# Patient Record
Sex: MALE | Race: BLACK OR AFRICAN AMERICAN | NOT HISPANIC OR LATINO | Employment: FULL TIME | URBAN - METROPOLITAN AREA
[De-identification: names, ages, dates, MRNs, and addresses within clinical notes are randomized per-mention and may not be internally consistent; named-entity substitution may affect disease eponyms.]

---

## 2024-05-02 ENCOUNTER — APPOINTMENT (OUTPATIENT)
Dept: CARDIOLOGY | Facility: HOSPITAL | Age: 59
End: 2024-05-02

## 2024-05-02 ENCOUNTER — HOSPITAL ENCOUNTER (EMERGENCY)
Facility: HOSPITAL | Age: 59
Discharge: HOME | End: 2024-05-03
Attending: EMERGENCY MEDICINE

## 2024-05-02 ENCOUNTER — APPOINTMENT (OUTPATIENT)
Dept: RADIOLOGY | Facility: HOSPITAL | Age: 59
End: 2024-05-02

## 2024-05-02 ENCOUNTER — HOSPITAL ENCOUNTER (OUTPATIENT)
Facility: HOSPITAL | Age: 59
Setting detail: OBSERVATION
Discharge: OTHER INSTITUTION | End: 2024-05-02
Attending: STUDENT IN AN ORGANIZED HEALTH CARE EDUCATION/TRAINING PROGRAM | Admitting: PSYCHIATRY & NEUROLOGY

## 2024-05-02 VITALS
SYSTOLIC BLOOD PRESSURE: 193 MMHG | DIASTOLIC BLOOD PRESSURE: 110 MMHG | OXYGEN SATURATION: 97 % | HEART RATE: 72 BPM | BODY MASS INDEX: 30.06 KG/M2 | RESPIRATION RATE: 15 BRPM | HEIGHT: 70 IN | TEMPERATURE: 98.4 F | WEIGHT: 210 LBS

## 2024-05-02 DIAGNOSIS — H47.011 NON-ARTERITIC ANTERIOR ISCHEMIC OPTIC NEUROPATHY OF RIGHT EYE: Primary | ICD-10-CM

## 2024-05-02 DIAGNOSIS — I10 ESSENTIAL HYPERTENSION: ICD-10-CM

## 2024-05-02 DIAGNOSIS — H53.9 VISION DISTURBANCE: Primary | ICD-10-CM

## 2024-05-02 LAB
ALBUMIN SERPL-MCNC: 4.2 G/DL (ref 3.5–5)
ALP BLD-CCNC: 64 U/L (ref 35–125)
ALT SERPL-CCNC: 17 U/L (ref 5–40)
ANION GAP SERPL CALC-SCNC: 11 MMOL/L
AST SERPL-CCNC: 20 U/L (ref 5–40)
BASOPHILS # BLD AUTO: 0.04 X10*3/UL (ref 0–0.1)
BASOPHILS NFR BLD AUTO: 0.7 %
BILIRUB SERPL-MCNC: 0.3 MG/DL (ref 0.1–1.2)
BUN SERPL-MCNC: 11 MG/DL (ref 8–25)
CALCIUM SERPL-MCNC: 9.2 MG/DL (ref 8.5–10.4)
CHLORIDE SERPL-SCNC: 105 MMOL/L (ref 97–107)
CO2 SERPL-SCNC: 28 MMOL/L (ref 24–31)
CREAT SERPL-MCNC: 1.3 MG/DL (ref 0.4–1.6)
EGFRCR SERPLBLD CKD-EPI 2021: 64 ML/MIN/1.73M*2
EOSINOPHIL # BLD AUTO: 0.12 X10*3/UL (ref 0–0.7)
EOSINOPHIL NFR BLD AUTO: 2 %
ERYTHROCYTE [DISTWIDTH] IN BLOOD BY AUTOMATED COUNT: 14.3 % (ref 11.5–14.5)
GLUCOSE SERPL-MCNC: 139 MG/DL (ref 65–99)
HCT VFR BLD AUTO: 40.5 % (ref 41–52)
HGB BLD-MCNC: 13 G/DL (ref 13.5–17.5)
IMM GRANULOCYTES # BLD AUTO: 0.02 X10*3/UL (ref 0–0.7)
IMM GRANULOCYTES NFR BLD AUTO: 0.3 % (ref 0–0.9)
INR PPP: 1 (ref 0.9–1.2)
LYMPHOCYTES # BLD AUTO: 2.47 X10*3/UL (ref 1.2–4.8)
LYMPHOCYTES NFR BLD AUTO: 41.7 %
MCH RBC QN AUTO: 29.6 PG (ref 26–34)
MCHC RBC AUTO-ENTMCNC: 32.1 G/DL (ref 32–36)
MCV RBC AUTO: 92 FL (ref 80–100)
MONOCYTES # BLD AUTO: 0.37 X10*3/UL (ref 0.1–1)
MONOCYTES NFR BLD AUTO: 6.2 %
NEUTROPHILS # BLD AUTO: 2.91 X10*3/UL (ref 1.2–7.7)
NEUTROPHILS NFR BLD AUTO: 49.1 %
NRBC BLD-RTO: 0 /100 WBCS (ref 0–0)
PLATELET # BLD AUTO: 262 X10*3/UL (ref 150–450)
POTASSIUM SERPL-SCNC: 4 MMOL/L (ref 3.4–5.1)
PROT SERPL-MCNC: 8 G/DL (ref 5.9–7.9)
PROTHROMBIN TIME: 10.5 SECONDS (ref 9.3–12.7)
RBC # BLD AUTO: 4.39 X10*6/UL (ref 4.5–5.9)
SODIUM SERPL-SCNC: 144 MMOL/L (ref 133–145)
WBC # BLD AUTO: 5.9 X10*3/UL (ref 4.4–11.3)

## 2024-05-02 PROCEDURE — 85025 COMPLETE CBC W/AUTO DIFF WBC: CPT | Performed by: PHYSICIAN ASSISTANT

## 2024-05-02 PROCEDURE — 99285 EMERGENCY DEPT VISIT HI MDM: CPT | Mod: 25

## 2024-05-02 PROCEDURE — 85610 PROTHROMBIN TIME: CPT | Performed by: PHYSICIAN ASSISTANT

## 2024-05-02 PROCEDURE — 70450 CT HEAD/BRAIN W/O DYE: CPT

## 2024-05-02 PROCEDURE — 86256 FLUORESCENT ANTIBODY TITER: CPT | Performed by: STUDENT IN AN ORGANIZED HEALTH CARE EDUCATION/TRAINING PROGRAM

## 2024-05-02 PROCEDURE — 80053 COMPREHEN METABOLIC PANEL: CPT | Performed by: PHYSICIAN ASSISTANT

## 2024-05-02 PROCEDURE — 2500000001 HC RX 250 WO HCPCS SELF ADMINISTERED DRUGS (ALT 637 FOR MEDICARE OP): Performed by: PHYSICIAN ASSISTANT

## 2024-05-02 PROCEDURE — 99285 EMERGENCY DEPT VISIT HI MDM: CPT

## 2024-05-02 PROCEDURE — G0378 HOSPITAL OBSERVATION PER HR: HCPCS

## 2024-05-02 PROCEDURE — 36415 COLL VENOUS BLD VENIPUNCTURE: CPT | Performed by: PHYSICIAN ASSISTANT

## 2024-05-02 PROCEDURE — 85652 RBC SED RATE AUTOMATED: CPT | Performed by: STUDENT IN AN ORGANIZED HEALTH CARE EDUCATION/TRAINING PROGRAM

## 2024-05-02 PROCEDURE — 93005 ELECTROCARDIOGRAM TRACING: CPT

## 2024-05-02 PROCEDURE — 70450 CT HEAD/BRAIN W/O DYE: CPT | Performed by: STUDENT IN AN ORGANIZED HEALTH CARE EDUCATION/TRAINING PROGRAM

## 2024-05-02 PROCEDURE — 36415 COLL VENOUS BLD VENIPUNCTURE: CPT | Performed by: STUDENT IN AN ORGANIZED HEALTH CARE EDUCATION/TRAINING PROGRAM

## 2024-05-02 PROCEDURE — 82565 ASSAY OF CREATININE: CPT | Performed by: STUDENT IN AN ORGANIZED HEALTH CARE EDUCATION/TRAINING PROGRAM

## 2024-05-02 PROCEDURE — 99285 EMERGENCY DEPT VISIT HI MDM: CPT | Performed by: EMERGENCY MEDICINE

## 2024-05-02 PROCEDURE — 86140 C-REACTIVE PROTEIN: CPT | Performed by: STUDENT IN AN ORGANIZED HEALTH CARE EDUCATION/TRAINING PROGRAM

## 2024-05-02 RX ORDER — TETRACAINE HYDROCHLORIDE 5 MG/ML
1 SOLUTION OPHTHALMIC ONCE
Status: COMPLETED | OUTPATIENT
Start: 2024-05-02 | End: 2024-05-02

## 2024-05-02 RX ORDER — ASPIRIN 325 MG
325 TABLET ORAL ONCE
Status: COMPLETED | OUTPATIENT
Start: 2024-05-02 | End: 2024-05-02

## 2024-05-02 RX ADMIN — ASPIRIN 325 MG: 325 TABLET ORAL at 16:12

## 2024-05-02 RX ADMIN — FLUORESCEIN SODIUM 1 STRIP: 1 STRIP OPHTHALMIC at 15:17

## 2024-05-02 RX ADMIN — TETRACAINE HYDROCHLORIDE 1 DROP: 5 SOLUTION OPHTHALMIC at 15:17

## 2024-05-02 ASSESSMENT — LIFESTYLE VARIABLES
HAVE YOU EVER FELT YOU SHOULD CUT DOWN ON YOUR DRINKING: NO
TOTAL SCORE: 0
EVER HAD A DRINK FIRST THING IN THE MORNING TO STEADY YOUR NERVES TO GET RID OF A HANGOVER: NO
EVER HAD A DRINK FIRST THING IN THE MORNING TO STEADY YOUR NERVES TO GET RID OF A HANGOVER: NO
EVER FELT BAD OR GUILTY ABOUT YOUR DRINKING: NO
HAVE PEOPLE ANNOYED YOU BY CRITICIZING YOUR DRINKING: NO
TOTAL SCORE: 0
HAVE PEOPLE ANNOYED YOU BY CRITICIZING YOUR DRINKING: NO
EVER FELT BAD OR GUILTY ABOUT YOUR DRINKING: NO
HAVE YOU EVER FELT YOU SHOULD CUT DOWN ON YOUR DRINKING: NO

## 2024-05-02 ASSESSMENT — COLUMBIA-SUICIDE SEVERITY RATING SCALE - C-SSRS
2. HAVE YOU ACTUALLY HAD ANY THOUGHTS OF KILLING YOURSELF?: NO
2. HAVE YOU ACTUALLY HAD ANY THOUGHTS OF KILLING YOURSELF?: NO
6. HAVE YOU EVER DONE ANYTHING, STARTED TO DO ANYTHING, OR PREPARED TO DO ANYTHING TO END YOUR LIFE?: NO
1. IN THE PAST MONTH, HAVE YOU WISHED YOU WERE DEAD OR WISHED YOU COULD GO TO SLEEP AND NOT WAKE UP?: NO
6. HAVE YOU EVER DONE ANYTHING, STARTED TO DO ANYTHING, OR PREPARED TO DO ANYTHING TO END YOUR LIFE?: NO
1. IN THE PAST MONTH, HAVE YOU WISHED YOU WERE DEAD OR WISHED YOU COULD GO TO SLEEP AND NOT WAKE UP?: NO

## 2024-05-02 ASSESSMENT — VISUAL ACUITY
OU: 20/40
OU: 20/40
OD: 20/200
OS: 20/40
OS: 20/40
OD: 20/200

## 2024-05-02 ASSESSMENT — PAIN SCALES - GENERAL
PAINLEVEL_OUTOF10: 0 - NO PAIN

## 2024-05-02 ASSESSMENT — PAIN - FUNCTIONAL ASSESSMENT
PAIN_FUNCTIONAL_ASSESSMENT: 0-10

## 2024-05-02 NOTE — ED PROVIDER NOTES
HPI   Chief Complaint   Patient presents with    Eye Problem     4/8 I started having cloudy vision no pain       58-year-old male presented emergency department the chief complaint evaluated and has been for about the last 3 and half weeks of his right eye.  It is not painful.  He denies any visual field defect.  States that his whole visual field is affected to the right eye.  There is no pain with extraocular movement.  He does not wear corrective lenses.  [place] and sent to the emergency department for further workup with concern for central retinal artery occlusion on the paperwork.  No other complaint.                          [place] Coma Scale Score: 15         NIH Stroke Scale: 0             Patient History   No past medical history on file.  No past surgical history on file.  No family history on file.  Social History     Tobacco Use    Smoking status: Not on file    Smokeless tobacco: Not on file   Substance Use Topics    Alcohol use: Not on file    Drug use: Not on file       Physical Exam   ED Triage Vitals [05/02/24 1256]   Temperature Heart Rate Respirations BP   36.9 °C (98.4 °F) 65 18 (!) 207/107      Pulse Ox Temp Source Heart Rate Source Patient Position   100 % Temporal Monitor Sitting      BP Location FiO2 (%)     Left arm --       Physical Exam  Vitals and nursing note reviewed.   Constitutional:       Appearance: Normal appearance.   HENT:      Head: Normocephalic.      Mouth/Throat:      Mouth: Mucous membranes are dry.   Eyes:      Extraocular Movements: Extraocular movements intact.      Pupils: Pupils are equal, round, and reactive to light.      Comments: Patient with intact visual fields and intact extraocular movements   Cardiovascular:      Rate and Rhythm: Normal rate and regular rhythm.   Pulmonary:      Effort: Pulmonary effort is normal.   Abdominal:      General: Abdomen is flat.   Musculoskeletal:         General: Normal range of motion.       Cervical back: Normal range of motion.   Skin:     General: Skin is warm.   Neurological:      Mental Status: He is alert and oriented to person, place, and time.         ED Course & MDM   ED Course as of 05/02/24 1548   u May 02, 2024   1402 EKG presented to me at 2:02 PM     EKG as interpreted by me shows sinus bradycardia at a ventricular rate of 58 bpm, normal axis, normal intervals, no STEMI.   [DH]      ED Course User Index  [DH] Tushar Melo MD         Diagnoses as of 05/02/24 1548   Vision disturbance       Medical Decision Making  I have seen and evaluated this patient.  The attending physician has also seen and evaluated this patient.  Vital signs, laboratory testing and diagnostic images if applicable have been reviewed.  All laboratory and imaging is interpreted by myself unless otherwise stated.  Radiology studies are also formally interpreted by radiologist.    CBC without significant leukocytosis or anemia, metabolic panel without significant renal impairment or electrolyte abnormality.  EKG acutely nonischemic, CT brain negative.  Patient with whole visual field right eye affected.  There is no focal visual field deficits or eye palsy on examination, NIH of 0.  Patient is admitted to internal medicine service, given aspirin, neurology consultation.  Admitted for further treatment and management.    Labs Reviewed  CBC WITH AUTO DIFFERENTIAL - Abnormal     WBC                           5.9                    nRBC                          0.0                    RBC                           4.39 (*)               Hemoglobin                    13.0 (*)               Hematocrit                    40.5 (*)               MCV                           92                     MCH                           29.6                   MCHC                          32.1                   RDW                           14.3                   Platelets                     262                    Neutrophils %                  49.1                   Immature Granulocytes %, Automated   0.3                    Lymphocytes %                 41.7                   Monocytes %                   6.2                    Eosinophils %                 2.0                    Basophils %                   0.7                    Neutrophils Absolute          2.91                   Immature Granulocytes Absolute, Au*   0.02                   Lymphocytes Absolute          2.47                   Monocytes Absolute            0.37                   Eosinophils Absolute          0.12                   Basophils Absolute            0.04                COMPREHENSIVE METABOLIC PANEL - Abnormal     Glucose                       139 (*)                Sodium                        144                    Potassium                     4.0                    Chloride                      105                    Bicarbonate                   28                     Urea Nitrogen                 11                     Creatinine                    1.30                   eGFR                          64                     Calcium                       9.2                    Albumin                       4.2                    Alkaline Phosphatase          64                     Total Protein                 8.0 (*)                AST                           20                     Bilirubin, Total              0.3                    ALT                           17                     Anion Gap                     11                  PROTIME-INR - Normal  CT head wo IV contrast   Final Result    No acute intracranial hemorrhage, mass effect, or CT apparent acute    infarct.          MACRO    None          Signed by: Sadiq Hernandez 5/2/2024 2:38 PM    Dictation workstation:   [id number]     Medications  aspirin tablet 325 mg (has no administration in time range)  fluorescein 1 mg ophthalmic strip 1 strip (1 strip Both Eyes Given 5/2/24 0867)  tetracaine (PF)  0.5 % ophthalmic solution 1 drop (1 drop Both Eyes Given 5/2/24 0127)  New Prescriptions  No medications on file            Procedure  Procedures     Neil Griffin PA-C  05/02/24 1309

## 2024-05-02 NOTE — PROGRESS NOTES
Pharmacy Medication History Review    Todd Hillman is a 58 y.o. male admitted for cloudy vision. Pharmacy reviewed the patient's [id number] medications and allergies for accuracy.    Medications ADDED:  none  Medications CHANGED:  none  Medications REMOVED:   none     The list below reflects the updated PTA list. Comments regarding how patient may be taking medications differently can be found in the Admit Orders Activity  None        The list below reflects the updated allergy list. Please review each documented allergy for additional clarification and justification.  Allergies  Reviewed by Neil Griffin PA-C on 5/2/2024   No Known Allergies         Pharmacy has been updated to  [facility].    Sources used to complete the med history include patient interview    Below are additional concerns with the patient's PTA list.  none    Meg Brice, PharmD  Please reach out via [employer] Secure Chat for questions

## 2024-05-02 NOTE — ED PROVIDER NOTES
HPI   Chief Complaint   Patient presents with    Eye Problem     4/8 I started having cloudy vision no pain       Patient is a 58-year-old male that presents to the emergency department for evaluation of right eye vision change.  Patient's notes that he started having blurred vision in his right eye for the last several weeks.  It came on gradually, nothing to do make it better or worse.  He does seem to notice it significantly more when he closes his left eye.  He states he is able to see vague motion however is unable to make out any details.  He denies any trauma to the eye or eye pain.  He denies headache, lightheadedness, dizziness, facial droop, nausea, vomiting, numbness tingling tremors or weakness of extremities.  Patient saw an optometrist today who sent him in for concern for possible retinal artery occlusion.      History provided by:  Patient                      [place] Coma Scale Score: 15                     Patient History   No past medical history on file.  No past surgical history on file.  No family history on file.  Social History     Tobacco Use    Smoking status: Not on file    Smokeless tobacco: Not on file   Substance Use Topics    Alcohol use: Not on file    Drug use: Not on file       Physical Exam   ED Triage Vitals [05/02/24 1256]   Temperature Heart Rate Respirations BP   36.9 °C (98.4 °F) 65 18 (!) 207/107      Pulse Ox Temp Source Heart Rate Source Patient Position   100 % Temporal Monitor Sitting      BP Location FiO2 (%)     Left arm --       Physical Exam  Vitals and nursing note reviewed.   Constitutional:       General: He is not in acute distress.     Appearance: Normal appearance. He is not ill-appearing.   HENT:      Head: Normocephalic and atraumatic.   Eyes:      Extraocular Movements: Extraocular movements intact.      Pupils: Pupils are equal, round, and reactive to light.      Comments: Patient does have normal vision in the left eye and is only able to see vague motions  in the right eye however he is able to see motion in all peripheral fields of vision on the right eye.  Pupils equal, round, reactive to light.  Pressure in the left eye is 17 mmHg pressure of the right eye is 18 mmHg, no corneal abrasion or ulcer on fluorescein stain of the right eye.  No proptosis noted   Cardiovascular:      Rate and Rhythm: Normal rate and regular rhythm.   Pulmonary:      Effort: Pulmonary effort is normal.   Abdominal:      General: Abdomen is flat.   Skin:     General: Skin is warm and dry.   Neurological:      General: No focal deficit present.      Mental Status: He is alert and oriented to person, place, and time.      Cranial Nerves: No cranial nerve deficit.      Sensory: No sensory deficit.      Motor: No weakness.      Coordination: Coordination normal.      Gait: Gait normal.       Recent Results (from the past 24 hour(s))   CBC and Auto Differential    Collection Time: 05/02/24  1:30 PM   Result Value Ref Range    WBC 5.9 4.4 - 11.3 x10*3/uL    nRBC 0.0 0.0 - 0.0 /100 WBCs    RBC 4.39 (L) 4.50 - 5.90 x10*6/uL    Hemoglobin 13.0 (L) 13.5 - 17.5 g/dL    Hematocrit 40.5 (L) 41.0 - 52.0 %    MCV 92 80 - 100 fL    MCH 29.6 26.0 - 34.0 pg    MCHC 32.1 32.0 - 36.0 g/dL    RDW 14.3 11.5 - 14.5 %    Platelets 262 150 - 450 x10*3/uL    Neutrophils % 49.1 40.0 - 80.0 %    Immature Granulocytes %, Automated 0.3 0.0 - 0.9 %    Lymphocytes % 41.7 13.0 - 44.0 %    Monocytes % 6.2 2.0 - 10.0 %    Eosinophils % 2.0 0.0 - 6.0 %    Basophils % 0.7 0.0 - 2.0 %    Neutrophils Absolute 2.91 1.20 - 7.70 x10*3/uL    Immature Granulocytes Absolute, Automated 0.02 0.00 - 0.70 x10*3/uL    Lymphocytes Absolute 2.47 1.20 - 4.80 x10*3/uL    Monocytes Absolute 0.37 0.10 - 1.00 x10*3/uL    Eosinophils Absolute 0.12 0.00 - 0.70 x10*3/uL    Basophils Absolute 0.04 0.00 - 0.10 x10*3/uL   Comprehensive metabolic panel    Collection Time: 05/02/24  1:30 PM   Result Value Ref Range    Glucose 139 (H) 65 - 99 mg/dL     Sodium 144 133 - 145 mmol/L    Potassium 4.0 3.4 - 5.1 mmol/L    Chloride 105 97 - 107 mmol/L    Bicarbonate 28 24 - 31 mmol/L    Urea Nitrogen 11 8 - 25 mg/dL    Creatinine 1.30 0.40 - 1.60 mg/dL    eGFR 64 >60 mL/min/1.73m*2    Calcium 9.2 8.5 - 10.4 mg/dL    Albumin 4.2 3.5 - 5.0 g/dL    Alkaline Phosphatase 64 35 - 125 U/L    Total Protein 8.0 (H) 5.9 - 7.9 g/dL    AST 20 5 - 40 U/L    Bilirubin, Total 0.3 0.1 - 1.2 mg/dL    ALT 17 5 - 40 U/L    Anion Gap 11 <=19 mmol/L   Protime-INR    Collection Time: 05/02/24  1:30 PM   Result Value Ref Range    Protime 10.5 9.3 - 12.7 seconds    INR 1.0 0.9 - 1.2       ED Course & [facility]   ED Course as of 05/02/24 1625   Thu May 02, 2024   1402 EKG presented to me at 2:02 PM     EKG as interpreted by me shows sinus bradycardia at a ventricular rate of 58 bpm, normal axis, normal intervals, no STEMI.   [DH]      ED Course User Index  [DH] Tushar Melo MD         Diagnoses as of 05/02/24 1625   Vision disturbance       Medical Decision Making  Patient is a 58-year-old male that presents emergency department for evaluation of right eye vision change.  Patient resting comfortably in no obvious distress.  Vital signs are stable on arrival.  Extraocular eye movements are intact.  There is no evidence of entrapment, proptosis on exam.  He has no corneal abrasion or ulceration noted on fluorescein staining and pressures are similar in both eyes with 17 mmHg on the left and 18 mmHg on the right.  Patient does have significant vision change on the right compared to the left as he only will see vague motions.  Neurologic exam is otherwise unremarkable with NIH score of 0.  Call placed to ophthalmologist for consult to see if patient requires transfer versus admission to [facility] for MRI and further evaluation.  In discussion with ophthalmology they recommend transferring emergency department to emergency department for evaluation as this could be something other than a retinal artery  occlusion.  I also discussed case with emergency physician at [facility] and they accepted patient for transfer.  Patient agreeable for transfer for further evaluation at this time.  He has remained hemodynamically stable.    Patient was seen by both myself and advanced practitioner.  I personally saw the patient and made/approved the management plan and take responsibility for the patient management     I independently interpreted the following study(s) EKG, CT scan of the brain which show EKG shows sinus bradycardia with a ventricular rate of 58 bpm, normal axis and no evidence of STEMI.  CT scan of the brain shows no evidence ofIntracranial hemorrhage, mass or stroke    I personally discussed the patient's management with ophthalmologist , who stated patient is to be transferred emergency department to emergency department for further evaluation.          Procedure  Procedures     Neil Phillips DO  05/02/24 5185

## 2024-05-03 ENCOUNTER — APPOINTMENT (OUTPATIENT)
Dept: RADIOLOGY | Facility: HOSPITAL | Age: 59
End: 2024-05-03

## 2024-05-03 VITALS
HEART RATE: 52 BPM | DIASTOLIC BLOOD PRESSURE: 100 MMHG | WEIGHT: 210 LBS | HEIGHT: 70 IN | TEMPERATURE: 98.3 F | SYSTOLIC BLOOD PRESSURE: 195 MMHG | RESPIRATION RATE: 18 BRPM | OXYGEN SATURATION: 100 % | BODY MASS INDEX: 30.06 KG/M2

## 2024-05-03 LAB
CREAT SERPL-MCNC: 1.27 MG/DL (ref 0.5–1.3)
CRP SERPL-MCNC: 0.11 MG/DL
EGFRCR SERPLBLD CKD-EPI 2021: 66 ML/MIN/1.73M*2
ERYTHROCYTE [SEDIMENTATION RATE] IN BLOOD BY WESTERGREN METHOD: 9 MM/H (ref 0–20)

## 2024-05-03 PROCEDURE — 70543 MRI ORBT/FAC/NCK W/O &W/DYE: CPT | Performed by: STUDENT IN AN ORGANIZED HEALTH CARE EDUCATION/TRAINING PROGRAM

## 2024-05-03 PROCEDURE — A9575 INJ GADOTERATE MEGLUMI 0.1ML: HCPCS | Performed by: EMERGENCY MEDICINE

## 2024-05-03 PROCEDURE — 70553 MRI BRAIN STEM W/O & W/DYE: CPT | Performed by: STUDENT IN AN ORGANIZED HEALTH CARE EDUCATION/TRAINING PROGRAM

## 2024-05-03 PROCEDURE — 2550000001 HC RX 255 CONTRASTS: Performed by: EMERGENCY MEDICINE

## 2024-05-03 PROCEDURE — 70553 MRI BRAIN STEM W/O & W/DYE: CPT

## 2024-05-03 PROCEDURE — 70543 MRI ORBT/FAC/NCK W/O &W/DYE: CPT

## 2024-05-03 RX ORDER — AMLODIPINE BESYLATE 5 MG/1
5 TABLET ORAL DAILY
Qty: 30 TABLET | Refills: 1 | Status: SHIPPED | OUTPATIENT
Start: 2024-05-03 | End: 2024-05-28 | Stop reason: SDUPTHER

## 2024-05-03 RX ORDER — GADOTERATE MEGLUMINE 376.9 MG/ML
19 INJECTION INTRAVENOUS
Status: COMPLETED | OUTPATIENT
Start: 2024-05-03 | End: 2024-05-03

## 2024-05-03 RX ADMIN — GADOTERATE MEGLUMINE 19 ML: 376.9 INJECTION INTRAVENOUS at 01:31

## 2024-05-03 NOTE — ED NOTES
Community care onsite. Report given. Pt to  Main ED.     Pt strongly advised to call his PCP tomorrow to follow up next week to have his BP checked and to be placed on BP meds. Pt advised about diet and lifestyle changes as well as oral water hydration.      Eduardo Laura RN  05/02/24 2030       Eduardo Laura RN  05/02/24 2032

## 2024-05-03 NOTE — PROGRESS NOTES
Patient was handed off to me from the previous team. For full history, physical, and prior ED course, please see previous provider note prior to patient handoff. This is an addendum to the record.    Briefly, this is a 58-year-old male who presents to the emergency department as transfer from outside hospital for several weeks of vision loss.  Concern for central retinal artery occlusion, was transferred here for evaluation by ophthalmology.  Pending ophthalmology evaluations and recommendations for disposition at time of signout.    Hospital Course/MDM:  Patient was evaluated by ophthalmology, they have a lower suspicion for central retinal artery occlusion, but their exam does demonstrate disc edema concerning for possible optic neuritis.  Recommended MRI brain and orbits which were performed, along with inflammatory labs which were unremarkable.  Pending ophthalmology recommendations for disposition at time of signout.    Disposition:  Signed out to oncoming provider    --  Eleni Duff MD  Emergency Medicine, PGY-3

## 2024-05-03 NOTE — ED TRIAGE NOTES
"Pt reports from [facility]. Per [facility] \"58-year-old male presented emergency department the chief complaint evaluated and has been for about the last 3 and half weeks of his right eye. It is not painful. He denies any visual field defect. States that his whole visual field is affected to the right eye. There is no pain with extraocular movement. He does not wear corrective lenses. Was seen by optometry at the [facility] earlier today and sent to the emergency department for further workup with concern for central retinal artery occlusion on the paperwork. No other complaint\" Cloudy right eye,  denies pain.   "

## 2024-05-03 NOTE — DISCHARGE INSTRUCTIONS
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  It is important that you follow-up with ophthalmology for this in the next 2 to 3 weeks, they will call you to arrange an appointment, but if you don't hear from their office in the next few days you may call the number below to schedule.    Start taking amlodipine which is a blood pressure medication.  You are being started on a low-dose, but will likely need to have your dose adjusted.  Call the number below to arrange to see a primary care doctor.      Obtain a blood pressure cuff at the pharmacy.  Measure your blood pressure around the same time each morning and evening, and keep a log of these readings.  Bring this log to your primary care doctor appointment.    Return to the emergency department if you have any emergent concerns.    --  For adult appointments/referrals:  [phone] ([phone])    --   Ophthalmology Clinic   [facility] [place]  Phone: [phone]

## 2024-05-03 NOTE — CONSULTS
Reason For Consult  Loss of vision    History Of Present Illness  Todd Hillman is a 58 y.o. male presenting with sudden vision loss 3 weeks ago. Patient states he awoke ?4/9 and noticed right eye was seeing a little worse but did not test monocular vision. A few days later was rubbing the left eye and noticed that the right eye was basically seeing shadows only both centrally and peripherally. Denies prodrome of floaters, flashes, diplopia, blurring. Patient was seen today by optometry outside who recommended ED evaluation. No ocular history prior to this, states both eyes normally see equally. No family history of blindness.    Denies recent history of tinnitus, polymyalgia rheumatica, temporal pain, headache,jaw claudication.    Patient has not had an eye exam or medical exam in years, but denies active HTN, diabetes, high cholesterol.      Past Medical History  He has no past medical history on file.    Physical Exam  Base Eye Exam       Visual Acuity (Snellen - Linear)         Right Left    Dist cc CF 1' 20/20 -1              Tonometry (Tonopen, 10:21 PM)         Right Left    Pressure 11 13              Pupils         Dark Light Shape React APD    Right 6 3 Round Brisk Yes    Left 6 3 Round Brisk None              Visual Fields (Counting fingers)         Left Right     Full                                 Extraocular Movement         Right Left     Full, Ortho Full, Ortho              Neuro/Psych       Oriented x3: Yes                  Additional Tests       Color         Right Left    Ishihara Unable 11/11                  Slit Lamp and Fundus Exam       External Exam         Right Left    External Normal Normal              Slit Lamp Exam         Right Left    Lids/Lashes Normal Normal    Conjunctiva/Sclera White and quiet White and quiet    Cornea Clear Clear    Anterior Chamber Deep and quiet Deep and quiet    Iris Round and reactive, no NVI Round and reactive    Lens 1-2+ NS 1-2+ NS    Anterior Vitreous  "Normal Normal              Fundus Exam         Right Left    Disc Grade 3 edema Normal    C/D Ratio  0.25    Macula Temporal peripapillary hemorrhage Normal    Vessels Normal Normal    Periphery Normal Normal                     Last Recorded Vitals  Blood pressure (!) 188/96, pulse 65, temperature 36.8 °C (98.3 °F), temperature source Oral, resp. rate 18, height 1.778 m (5' 10\"), weight 95.3 kg (210 lb), SpO2 97%.    Relevant Results, Personally Reviewed  CT head w/o contrast - no acute hemorrhage, mass  MRI brain & orbit w/wo - no acute findings   ESR/CRP - 9/0.11  CBC - mild anemia  CMP - slightly elevated nonfasting glucose (150s)      Assessment/Plan   This is a 58 year old male presenting with vision loss OD over 3-4 weeks. Exam is significant for RAPD OD, CF @ 1' vision, with grade 3 optic nerve edema OD. OS exam all within normal limits. Patient is a nonsmoker with a good diet, no drug use, limited alcohol use, without known diabetes. BP has been elevated while in the emergency department concerning for hypertension, and patient may have (?) sleep apnea. Unilateral disc edema in the setting of largely decreased vision OD is concerning for NAION, however could be atypical optic neuritis vs GCA. With negative MRI and ESR/CRP, most likely diagnosis NAION.    #Unilateral optic nerve edema OD  #Likely NAION OD  - Recommend MRI brain/orbit w and w/o - no atypical findings  - Recommend ESR, CRP  > 9, 0.11  - Discussed guarded visual prognosis with patient and concern for diagnosis  - Risk factors, HTN, ?CLEMENT. No substance use, smoking, known diabetes  - Recommend establishing with a PCP for BP control as well as CLEMENT, diabetes, HDL testing and monitoring as indicated  - Monocular precautions  - Strict return precautions for vision changes OS discussed with patient  - Patient will require ophthalmology follow up - will arrange outpatient      Tomas Benton MD  PGY2 Ophthalmology     Ophthalmology Adult Pager - " [phone]  Ophthalmology Pediatrics Pager - [phone]    For adult follow-up appointments, call: [phone]  For pediatric follow-up appointments, call: [phone]      NOTE: This note is not finalized until attending reviews and signs.

## 2024-05-03 NOTE — PROGRESS NOTES
This is an attestation to a prior handoff note.    Discussed findings with ophthalmology.  Consistent with NAION.  No acute intervention.  Recommended blood pressure control and outpatient follow-up with ophthalmology in 2 to 3 weeks.    Discussed findings with patient.  Provided prescription for amlodipine, along with information for primary care follow-up and instructions to obtain a blood pressure cuff and measure blood pressures twice a day and bring a log to his primary care appointment.  Patient comfortable with plan for discharge and outpatient follow-up.    Disposition:  Discharge home    --  Eleni Duff MD  Emergency Medicine, PGY-3

## 2024-05-03 NOTE — ED NOTES
Discharge paperwork gone over with pt. Education provided and prescriptions (if applicable) were given to pt. Pt IV removed and there is no bleeding at the site of removal. Pt was ambulatory out of ED independently. Pt has no further questions or concerns at this time. Treatment complete.        Keny Armijo RN  05/03/24 7907

## 2024-05-03 NOTE — ED PROVIDER NOTES
CC: Eye Problem     History provided by: Patient  Limitations to History: None    HPI:  Todd Hillman is a 58 y.o. male with no pertinent past medical history who presents today for right eye vision loss.  The patient states his right eye vision loss has been progressive over the past 3 weeks and denies any pain associated with this.  He denies any trauma to the eyes and does not wear contacts or glasses. No falls or injuries. The patient was seen by optometrist earlier today and was sent to [facility] emergency department with concerns for possible CRAO.  Evaluation at [facility] showed normal IOP with no other concerning symptoms of stroke.  The patient was noted to be hypertensive there but does not have a history of hypertension.  Patient was transferred by private vehicle to [facility] ED for further ophthalmology evaluation.  The patient denies any headaches, dizziness, neck pain, chest pain, shortness of breath, nausea, vomiting, abdominal pain, diarrhea, urinary symptoms, speech difficulties, focal weakness, numbness/tingling, urinary retention/incontinence or other concerning symptoms at this time. No fevers or chills. He denies any current pain.    External Records Reviewed: ED visit from [facility] reviewed  ???????????????????????????????????????????????????????????????  Triage Vitals:  T 36.8 °C (98.3 °F)  HR 65  BP (!) 188/96  RR 18  O2 97 % None (Room air)    Vital signs reviewed in nursing triage note, EMR flow sheets, and at patient's bedside.   General: Awake, alert, oriented, in no acute distress. Resting comfortably.  Eyes: Gaze conjugate. PERRL. EOMI. No scleral icterus or injection.  Diminished vision of the right eye able to see motion and gross shapes but unable to verbalize what objects are.  HENT: Normo-cephalic, atraumatic. No stridor. No rhinorrhea or epistaxis.  Neck: supple, full ROM intact, no meningeal signs.  CV: Regular rhythm. No murmurs appreciated. Radial pulses 2+  bilaterally  Respiratory: Breathing non-labored, speaking in full sentences.  Clear to auscultation bilaterally  GI: Soft, non-distended, non-tender. No rebound or guarding.  MSK/Extremities: No gross bony deformities. Moving all extremities with full ROM intact. No lower extremity edema.  Skin: Warm. Appropriate color  Neuro: A&Ox3.  Normal speech pattern. PERRLA. EOMI. normal vision of the left eye.  Able to depict motion in the right eye.  No facial droop.  Symmetric smile.  Equal cheek puffing.  Normal sensation to light touch in V1-3.  5/5 strength shoulder shrug.  5/5 strength in bilateral UE and LE.  Normal sensation to light touch in bilateral UE and LE.  No ataxic gait.    Psych: Appropriate mood and affect   ???????????????????????????????????????????????????????????????  ED Course/Treatment/Medical Decision Making  MDM:  Todd Hillman is a 58 y.o. male with no pertinent past medical history that presents the emergency department today for painless right eye vision changes that has occurred over the past 3 weeks.  Upon arrival to the emergency department the patient was hypertensive with an initial blood pressure of 188/96 but had otherwise stable vital signs and was afebrile.  Physical exam was notable for significantly diminished vision in the right eye with no other focal neurologic deficits.  Ophthalmology was consulted given the patient's significant concern for vision loss and is evaluating the patient here in the emergency department.    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  At  the time of signout, the patient was undergoing ophthalmology evaluation and is pending final optho recommendations and further workup. Patient pending remainder of ED course and final dispositoin. The patient was signed out to Eleni Duff MD in stable condition.    Social Determinants Limiting Care:  None identified    Impression:  Right eye vision loss    Disposition:  Signed out to oncoming provider    Assessment and plan discussed with Dr. Hilary Caruso, DO   Emergency Medicine, PGY-1     Disclaimer: This note was dictated by speech recognition. Minor errors in transcription may be present.     Procedures ? [employer] last updated 5/2/2024 9:48 PM        Juancho Caruso,   Resident  05/05/24 1101    I saw and evaluated the patient. I personally obtained the key and critical portions of the history and physical exam or was physically present for key and critical portions performed by the resident/fellow. I reviewed the resident/fellow's documentation and discussed the patient with the resident/fellow. I agree with the resident/fellow's medical decision making as documented in the note.    MD Hilary Hurtado MD  05/05/24 4182

## 2024-05-27 NOTE — PROGRESS NOTES
Assessment and Plan    05/03/2024 MRI brain & orbits with contrast, which I personally reviewed, shows bihemispheric white matter FLAIR lesions consistent with chronic ischemic changes.  05/02/2024 CT head without contrast, which I personally reviewed, shows no acute lesion.    Lab Results   Component Value Date/Time    SEDRATE 9 05/02/2024 2347    CRP 0.11 05/02/2024 2347      05/28/24 OCT RNFL OD G79, T and TI thinning, superior elevation & OS G87,TS borderline    05/28/24 Rodriguez visual field 24-2 OD stim V, fovea 28 generalized depression, FL 9/15 FP 0/9, FN 0/0 & OS fovea 36, wnl, MD -0.01dB     This 58 year-old man with a history of HTN, obesity presents for evaluation of right optic disc edema.    The optic disc edema noted on 05/02/2024 has now resolved, and optic disc pallor is developing. 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 We discussed the natural history, permanence of vision loss, and 20% risk of occurrence in the fellow eye.     Plan  -vasculopathic risk factor control  -referral to primary care  -blood pressure medication dosing in the morning  -minmimize sildenafil as able  -monocular precautions     Follow up 3-4 months with Rodriguez visual field (HVF) 24-2 and OCT RNFL both eyes. (Dilated 5/28/2024)

## 2024-05-28 ENCOUNTER — FOLLOW-UP (OUTPATIENT)
Dept: OPHTHALMOLOGY | Facility: CLINIC | Age: 59
End: 2024-05-28

## 2024-05-28 DIAGNOSIS — H47.011 NON-ARTERITIC ANTERIOR ISCHEMIC OPTIC NEUROPATHY OF RIGHT EYE: ICD-10-CM

## 2024-05-28 DIAGNOSIS — H47.10 EDEMA OF OPTIC DISC OF RIGHT EYE: Primary | ICD-10-CM

## 2024-05-28 DIAGNOSIS — I10 ESSENTIAL HYPERTENSION: ICD-10-CM

## 2024-05-28 PROCEDURE — 92083 EXTENDED VISUAL FIELD XM: CPT | Performed by: PSYCHIATRY & NEUROLOGY

## 2024-05-28 PROCEDURE — 92133 CPTRZD OPH DX IMG PST SGM ON: CPT | Performed by: PSYCHIATRY & NEUROLOGY

## 2024-05-28 PROCEDURE — 99205 OFFICE O/P NEW HI 60 MIN: CPT | Performed by: PSYCHIATRY & NEUROLOGY

## 2024-05-28 RX ORDER — AMLODIPINE BESYLATE 5 MG/1
5 TABLET ORAL DAILY
Qty: 30 TABLET | Refills: 1 | Status: SHIPPED | OUTPATIENT
Start: 2024-05-28 | End: 2024-07-27

## 2024-05-28 ASSESSMENT — ENCOUNTER SYMPTOMS
EYES NEGATIVE: 1
CARDIOVASCULAR NEGATIVE: 0
ALLERGIC/IMMUNOLOGIC NEGATIVE: 0
GASTROINTESTINAL NEGATIVE: 0
ENDOCRINE NEGATIVE: 0
NEUROLOGICAL NEGATIVE: 0
MUSCULOSKELETAL NEGATIVE: 0
HEMATOLOGIC/LYMPHATIC NEGATIVE: 0
CONSTITUTIONAL NEGATIVE: 0
RESPIRATORY NEGATIVE: 0
PSYCHIATRIC NEGATIVE: 0

## 2024-05-28 ASSESSMENT — CONF VISUAL FIELD
OS_INFERIOR_TEMPORAL_RESTRICTION: 0
METHOD: COUNTING FINGERS
OD_SUPERIOR_TEMPORAL_RESTRICTION: 3
OS_NORMAL: 1
OS_INFERIOR_NASAL_RESTRICTION: 0
OD_INFERIOR_NASAL_RESTRICTION: 3
OD_SUPERIOR_NASAL_RESTRICTION: 3
OD_INFERIOR_TEMPORAL_RESTRICTION: 3
OS_SUPERIOR_NASAL_RESTRICTION: 0
OS_SUPERIOR_TEMPORAL_RESTRICTION: 0

## 2024-05-28 ASSESSMENT — TONOMETRY
OD_IOP_MMHG: 16
OS_IOP_MMHG: 16
IOP_METHOD: GOLDMANN APPLANATION

## 2024-05-28 ASSESSMENT — CUP TO DISC RATIO
OD_RATIO: 0.1
OS_RATIO: 0.1

## 2024-05-28 ASSESSMENT — SLIT LAMP EXAM - LIDS
COMMENTS: NORMAL
COMMENTS: NORMAL

## 2024-05-28 ASSESSMENT — VISUAL ACUITY
OD_PH_SC: 20/800
OS_PH_SC: 20/20-1
OS_SC: 20/25-2
OD_SC: 20/1000
METHOD: SNELLEN - LINEAR

## 2024-05-28 ASSESSMENT — EXTERNAL EXAM - RIGHT EYE: OD_EXAM: NORMAL

## 2024-05-28 ASSESSMENT — EXTERNAL EXAM - LEFT EYE: OS_EXAM: NORMAL

## 2024-06-20 ENCOUNTER — APPOINTMENT (OUTPATIENT)
Dept: PRIMARY CARE | Facility: CLINIC | Age: 59
End: 2024-06-20

## 2024-09-30 ENCOUNTER — APPOINTMENT (OUTPATIENT)
Dept: OPHTHALMOLOGY | Facility: CLINIC | Age: 59
End: 2024-09-30